# Patient Record
Sex: MALE | Race: WHITE | NOT HISPANIC OR LATINO | Employment: STUDENT | URBAN - METROPOLITAN AREA
[De-identification: names, ages, dates, MRNs, and addresses within clinical notes are randomized per-mention and may not be internally consistent; named-entity substitution may affect disease eponyms.]

---

## 2022-05-29 ENCOUNTER — APPOINTMENT (OUTPATIENT)
Dept: ULTRASOUND IMAGING | Facility: CLINIC | Age: 22
End: 2022-05-29
Attending: NURSE PRACTITIONER
Payer: COMMERCIAL

## 2022-05-29 ENCOUNTER — OFFICE VISIT (OUTPATIENT)
Dept: URGENT CARE | Facility: URGENT CARE | Age: 22
End: 2022-05-29
Payer: COMMERCIAL

## 2022-05-29 ENCOUNTER — HOSPITAL ENCOUNTER (EMERGENCY)
Facility: CLINIC | Age: 22
Discharge: HOME | End: 2022-05-29
Attending: EMERGENCY MEDICINE | Admitting: EMERGENCY MEDICINE
Payer: COMMERCIAL

## 2022-05-29 VITALS
TEMPERATURE: 98.5 F | HEART RATE: 91 BPM | SYSTOLIC BLOOD PRESSURE: 131 MMHG | OXYGEN SATURATION: 97 % | RESPIRATION RATE: 16 BRPM | BODY MASS INDEX: 29.03 KG/M2 | WEIGHT: 220 LBS | DIASTOLIC BLOOD PRESSURE: 86 MMHG

## 2022-05-29 VITALS
SYSTOLIC BLOOD PRESSURE: 145 MMHG | OXYGEN SATURATION: 98 % | TEMPERATURE: 97.3 F | HEART RATE: 106 BPM | HEIGHT: 73 IN | DIASTOLIC BLOOD PRESSURE: 94 MMHG | WEIGHT: 210 LBS | BODY MASS INDEX: 27.83 KG/M2

## 2022-05-29 DIAGNOSIS — N43.3 HYDROCELE, UNSPECIFIED HYDROCELE TYPE: ICD-10-CM

## 2022-05-29 DIAGNOSIS — N45.1 EPIDIDYMITIS: ICD-10-CM

## 2022-05-29 DIAGNOSIS — N50.819 EPIDIDYMAL PAIN: Primary | ICD-10-CM

## 2022-05-29 LAB
ALBUMIN UR-MCNC: NEGATIVE MG/DL
APPEARANCE UR: CLEAR
BILIRUB UR QL STRIP: NEGATIVE
COLOR UR AUTO: ABNORMAL
GLUCOSE UR STRIP-MCNC: NEGATIVE MG/DL
HGB UR QL STRIP: NEGATIVE
KETONES UR STRIP-MCNC: NEGATIVE MG/DL
LEUKOCYTE ESTERASE UR QL STRIP: NEGATIVE
MUCOUS THREADS #/AREA URNS LPF: PRESENT /LPF
NITRATE UR QL: NEGATIVE
PH UR STRIP: 6 [PH] (ref 5–7)
RBC URINE: 1 /HPF
SP GR UR STRIP: 1.03 (ref 1–1.03)
UROBILINOGEN UR STRIP-MCNC: NORMAL MG/DL
WBC URINE: 1 /HPF

## 2022-05-29 PROCEDURE — 99284 EMERGENCY DEPT VISIT MOD MDM: CPT | Mod: 25 | Performed by: EMERGENCY MEDICINE

## 2022-05-29 PROCEDURE — 93976 VASCULAR STUDY: CPT

## 2022-05-29 PROCEDURE — 99284 EMERGENCY DEPT VISIT MOD MDM: CPT | Performed by: EMERGENCY MEDICINE

## 2022-05-29 PROCEDURE — 81001 URINALYSIS AUTO W/SCOPE: CPT | Performed by: NURSE PRACTITIONER

## 2022-05-29 PROCEDURE — 99203 OFFICE O/P NEW LOW 30 MIN: CPT | Performed by: FAMILY MEDICINE

## 2022-05-29 PROCEDURE — 76870 US EXAM SCROTUM: CPT | Mod: 26 | Performed by: RADIOLOGY

## 2022-05-29 PROCEDURE — 87491 CHLMYD TRACH DNA AMP PROBE: CPT | Performed by: NURSE PRACTITIONER

## 2022-05-29 PROCEDURE — 87591 N.GONORRHOEAE DNA AMP PROB: CPT | Performed by: NURSE PRACTITIONER

## 2022-05-29 RX ORDER — LEVOFLOXACIN 500 MG/1
500 TABLET, FILM COATED ORAL DAILY
Qty: 10 TABLET | Refills: 0 | Status: SHIPPED | OUTPATIENT
Start: 2022-05-29 | End: 2022-06-08

## 2022-05-29 ASSESSMENT — ENCOUNTER SYMPTOMS
VOMITING: 0
CHILLS: 0
HEADACHES: 0
SHORTNESS OF BREATH: 0
ABDOMINAL PAIN: 1
FREQUENCY: 1
FREQUENCY: 0
NAUSEA: 0
DYSURIA: 0
DIFFICULTY URINATING: 0
FEVER: 0
DIARRHEA: 1
BACK PAIN: 0
EYE REDNESS: 0
COUGH: 0
CONSTIPATION: 0

## 2022-05-29 NOTE — ED PROVIDER NOTES
ED Triage Provider Note  [facility]  Encounter Date: May 29, 2022    History:  Chief Complaint   Patient presents with     Referral     Molly Jono Corado is a 21 year old male who presents to the ED from urgent care for right testicular pain and lower abdominal pressure that began approximately 2 days ago. He denies nausea, fevers, urinary frequency or dysruia. Was seen at  and sent to ED for ultrasound to rule out torsion vs epididymis.     Pain is slightly worse when he sits down. No penile discharge, and reports only one sexual partner in a mutually monogamous relationship and is not worried about STD exposure.      Review of Systems:  Review of Systems   Constitutional: Negative for chills and fever.   Respiratory: Negative for shortness of breath.    Cardiovascular: Negative for chest pain.   Genitourinary: Negative for dysuria and frequency.       Exam:  There were no vitals taken for this visit.  Physical Exam  Vitals and nursing note reviewed.   Cardiovascular:      Rate and Rhythm: Normal rate and regular rhythm.   Pulmonary:      Effort: Pulmonary effort is normal.   Abdominal:      General: There is no distension.      Palpations: Abdomen is soft.      Tenderness: There is no abdominal tenderness.   Neurological:      Mental Status: He is alert.         Medical Decision Making:  Patient arriving to the ED with problem as above. A medical screening exam was performed. UA, ultrasound orders initiated from Triage. The patient is appropriate to wait in triage.      CECE Mejia CNP on 5/29/2022 at 5:53 PM        Dheeraj Hawkins APRN CNP  05/29/22 1954

## 2022-05-29 NOTE — PROGRESS NOTES
Subjective: 2 days ago patient started noticing some mild right testicular pain along with some lower abdominal pain that is kind of on both sides.  No nausea, no fever, no symptoms with urination.  It hurts a little more when he sits down to have a bowel movement.  He feels he has no STD risks, only 1 sexual partner in a mutually monogamous relationship.    Objective: He has tenderness in the right epididymis area the left is normal.  No adenopathy.  Abdomen is benign.    Assessment and plan: Right epididymal pain but it a 21-year-old 1 has to be concerned about testicular torsion as well as epididymitis.  He will go over to the emergency room and have a more complete evaluation.  He understands that if it is testicular torsion he will need surgery tonight but otherwise will be treated with antibiotics.

## 2022-05-29 NOTE — ED TRIAGE NOTES
Pt was sent from urgent care for concern of right testicular pain and lower abdominal pressure that started 2.5 days ago    Pt was told to request an ultrasound.     Rates pain 3/10.       VSS

## 2022-05-30 LAB
C TRACH DNA SPEC QL NAA+PROBE: NEGATIVE
N GONORRHOEA DNA SPEC QL NAA+PROBE: NEGATIVE

## 2022-05-30 NOTE — ED PROVIDER NOTES
ED Provider Note  [facility]      History     Chief Complaint   Patient presents with     Referral     HPI  Molly Jono Corado is a 21 year old male who presents emergency department with 2.5 days of right-sided testicular pain, seen at urgent care and referred here for ultrasound for suspicion for testicular torsion.  At urgent care, he was told he likely has epididymitis but they did not want to miss a testicular torsion so he was referred here for further evaluation.  Patient reports the pain is mild and not particularly bothersome as long as he does not manipulate the upper portion of the right side of the scrotum.  He denies any dysuria, penile discharge.  States he may have noticed some increase in urinary frequency.  Patient additionally reports a few days of intermittent diarrhea and mild left lower quadrant abdominal pain.  He denies any fevers or chills, no cough, no chest pain, no shortness of breath.  No nausea or vomiting.  No prior episodes of this in the past.  Patient reports that he is not concerned about a sexually transmitted infection at all.    Past Medical History  No past medical history on file.  No past surgical history on file.  levofloxacin (LEVAQUIN) 500 MG tablet      Allergies   Allergen Reactions     Polytrim [Polymyxin B-Trimethoprim]      Family History  No family history on file.  Social History   Social History     Tobacco Use     Smoking status: Never Smoker     Smokeless tobacco: Never Used      Past medical history, past surgical history, medications, allergies, family history, and social history were reviewed with the patient. No additional pertinent items.       Review of Systems   Constitutional: Negative for fever.   HENT: Negative for congestion.    Eyes: Negative for redness.   Respiratory: Negative for cough and shortness of breath.    Cardiovascular: Negative for chest pain.   Gastrointestinal: Positive for abdominal pain and diarrhea. Negative  for constipation, nausea and vomiting.   Genitourinary: Positive for frequency and testicular pain. Negative for difficulty urinating, genital sores, penile discharge, penile pain and penile swelling.   Musculoskeletal: Negative for back pain.   Skin: Negative for rash.   Neurological: Negative for headaches.   Psychiatric/Behavioral: Negative for suicidal ideas.     A complete review of systems was performed with pertinent positives and negatives noted in the HPI, and all other systems negative.    Physical Exam   BP: (!) 153/75  Pulse: 112  Temp: 98.5  F (36.9  C)  Resp: 16  Weight: 99.8 kg (220 lb)  SpO2: 98 %  Physical Exam  Constitutional:       General: He is awake. He is not in acute distress.     Appearance: Normal appearance. He is well-developed. He is not ill-appearing or toxic-appearing.   HENT:      Head: Atraumatic.   Eyes:      General: No scleral icterus.     Pupils: Pupils are equal, round, and reactive to light.   Cardiovascular:      Rate and Rhythm: Normal rate.   Pulmonary:      Effort: Pulmonary effort is normal. No respiratory distress.   Abdominal:      General: Bowel sounds are normal.      Palpations: Abdomen is soft.      Tenderness: There is abdominal tenderness in the suprapubic area and left lower quadrant.      Comments: Mild left lower quadrant and suprapubic tenderness to palpation.  No right inguinal hernia, no tenderness to palpation along the right inguinal ligament.   Genitourinary:     Pubic Area: No rash.       Penis: Normal. No erythema, discharge or swelling.       Testes: Normal.         Right: Tenderness, swelling, testicular hydrocele or varicocele not present.         Left: Tenderness, swelling, testicular hydrocele or varicocele not present.      Epididymis:      Right: Not inflamed or enlarged. Tenderness present. No mass.      Left: No tenderness.      Comments: Patient declined my offer of a chaperone  Musculoskeletal:         General: No tenderness.    Lymphadenopathy:      Lower Body: No right inguinal adenopathy. No left inguinal adenopathy.   Skin:     General: Skin is warm.      Findings: No rash.   Neurological:      Mental Status: He is alert and oriented to person, place, and time.   Psychiatric:         Mood and Affect: Mood normal.         Speech: Speech normal.         Behavior: Behavior is cooperative.         ED Course      Procedures                     Results for orders placed or performed during the hospital encounter of 05/29/22   US Testicular & Scrotum w Doppler Ltd     Status: None    Narrative    EXAMINATION: US TESTICULAR AND SCROTAL, 5/29/2022 8:05 PM     COMPARISON: None.    HISTORY: Right testicular pain    TECHNIQUE: The scrotum was scanned in standard fashion with  specialized ultrasound transducer(s) using both grey scale and limited  color Doppler techniques.    Findings:  The testes demonstrate normal and symmetric echotexture and  vascularity. No evidence of a focal lesion.  The right testicle  measures 2.8 x 2.1 x 4.1 cm and the left measures 3.1 x 2.3 x 4.5 cm.  There is no evidence of torsion.    Small right hydrocele.    7 mm left epididymal head cyst/hematocele.      Impression    Impression:   1.  No acute findings.  2.  Small right hydrocele.    I have personally reviewed the examination and initial interpretation  and I agree with the findings.    MICHEL LAKE MD         SYSTEM ID:  [id number]   UA with Microscopic reflex to Culture     Status: Abnormal    Specimen: Urine, Clean Catch   Result Value Ref Range    Color Urine Light Yellow Colorless, Straw, Light Yellow, Yellow    Appearance Urine Clear Clear    Glucose Urine Negative Negative mg/dL    Bilirubin Urine Negative Negative    Ketones Urine Negative Negative mg/dL    Specific Gravity Urine 1.030 1.003 - 1.035    Blood Urine Negative Negative    pH Urine 6.0 5.0 - 7.0    Protein Albumin Urine Negative Negative mg/dL    Urobilinogen Urine Normal Normal, 2.0 mg/dL     Nitrite Urine Negative Negative    Leukocyte Esterase Urine Negative Negative    Mucus Urine Present (A) None Seen /LPF    RBC Urine 1 <=2 /HPF    WBC Urine 1 <=5 /HPF    Narrative    Urine Culture not indicated     Medications - No data to display     Assessments & Plan (with Medical Decision Making)   Molly Corado is a 21 year old male who presents emergency department with 2.5 days of right-sided testicular pain, seen at urgent care and referred here for ultrasound for suspicion for testicular torsion.  Scrotal pain is most consistent with right-sided epididymitis, however we will proceed with work-up for torsion although my suspicion for this is quite low but not completely 0.  Will obtain ultrasound as well as urine studies, investigate for sexually transmitted diseases as well.  Offer the patient pain medication and he would like to hold off at this time, states that he is not in discomfort as long as  is scrotum has not manipulated.  As far as his left lower quadrant abdominal pain, he rates his pain as mild.  Given the fact that his scrotal pain is on the right side, I do not feel that these are connected in and his left lower quadrant abdominal discomfort is most consistent with intestinal cramping likely from a gastrointestinal illness causing diarrhea.  Do not feel that he requires further evaluation for this with blood work as I do not feel that it will likely .  Given his very low degree of tenderness, do not feel that he requires imaging of his abdomen at this time either.    I have reviewed the nursing notes. I have reviewed the findings, diagnosis, plan and need for follow up with the patient.    Labs with reassuring urinalysis.  Ultrasound with no evidence of torsion, has a small hydrocele on the right which given his tenderness to palpation over the epididymis is concerning for epididymitis.  Patient denies insertive anal intercourse so will prescribe 10 days of levofloxacin.   Given the fact that he has a hydrocele, will refer to urology for outpatient follow-up to monitor for resolution of the hydrocele.  Return precautions given.  Okay to discharge    Discharge Medication List as of 5/29/2022  9:53 PM      START taking these medications    Details   levofloxacin (LEVAQUIN) 500 MG tablet Take 1 tablet (500 mg) by mouth daily for 10 days, Disp-10 tablet, R-0, Local Print             Final diagnoses:   Epididymitis   Hydrocele, unspecified hydrocele type       --  Maynor Lux MD PhD  [facility] EMERGENCY DEPARTMENT  5/29/2022     Maynor Lux MD  05/29/22 [id number]

## 2022-06-29 ENCOUNTER — PRE VISIT (OUTPATIENT)
Dept: UROLOGY | Facility: CLINIC | Age: 22
End: 2022-06-29

## 2022-06-29 NOTE — TELEPHONE ENCOUNTER
MEDICAL RECORDS REQUEST   [facility]  Urology Clinic  [address] [place] [address]  PHONE: [phone]  Fax: [phone]        FUTURE VISIT INFORMATION                                                   Molly Corado, : 2000 scheduled for future visit at [facility]    APPOINTMENT INFORMATION:    Date: 2022    Provider:  Annette Calvert PA-C    Reason for Visit/Diagnosis: Hydrocele    REFERRAL INFORMATION:    Referring provider:  Maynor Lux MD    RECORDS REQUESTED FOR VISIT                                                     NOTES  STATUS/DETAILS   DISCHARGE REPORT from the ER  yes, 2022 -- Maynor Lux MD -- [facility]   LABS     URINALYSIS (UA)  yes   IMAGING (IMAGES & REPORT)  yes, 2022 -- US TESTICLE and Scrotum     PRE-VISIT CHECKLIST      Record collection complete Yes   Appointment appropriately scheduled           (right time/right provider) Yes   Joint diagnostic appointment coordinated correctly          (ensure right order & amount of time) Yes   MyChart activation If no, please explain pending   Questionnaire complete If no, please explain pending

## 2022-08-02 ENCOUNTER — PRE VISIT (OUTPATIENT)
Dept: UROLOGY | Facility: CLINIC | Age: 22
End: 2022-08-02

## 2022-10-03 ENCOUNTER — HEALTH MAINTENANCE LETTER (OUTPATIENT)
Age: 22
End: 2022-10-03

## 2023-10-22 ENCOUNTER — HEALTH MAINTENANCE LETTER (OUTPATIENT)
Age: 23
End: 2023-10-22

## 2024-12-15 ENCOUNTER — HEALTH MAINTENANCE LETTER (OUTPATIENT)
Age: 24
End: 2024-12-15